# Patient Record
Sex: MALE | Race: WHITE | HISPANIC OR LATINO
[De-identification: names, ages, dates, MRNs, and addresses within clinical notes are randomized per-mention and may not be internally consistent; named-entity substitution may affect disease eponyms.]

---

## 2017-01-04 ENCOUNTER — APPOINTMENT (OUTPATIENT)
Dept: ORTHOPEDIC SURGERY | Facility: CLINIC | Age: 67
End: 2017-01-04

## 2017-01-04 VITALS — WEIGHT: 150 LBS | BODY MASS INDEX: 23.54 KG/M2 | HEIGHT: 67 IN

## 2017-01-04 DIAGNOSIS — M75.122 COMPLETE ROTATOR CUFF TEAR OR RUPTURE OF LEFT SHOULDER, NOT SPECIFIED AS TRAUMATIC: ICD-10-CM

## 2017-04-20 ENCOUNTER — APPOINTMENT (OUTPATIENT)
Dept: ORTHOPEDIC SURGERY | Facility: CLINIC | Age: 67
End: 2017-04-20

## 2017-04-20 DIAGNOSIS — M67.912 UNSPECIFIED DISORDER OF SYNOVIUM AND TENDON, LEFT SHOULDER: ICD-10-CM

## 2017-05-25 ENCOUNTER — APPOINTMENT (OUTPATIENT)
Dept: ORTHOPEDIC SURGERY | Facility: CLINIC | Age: 67
End: 2017-05-25

## 2017-05-25 DIAGNOSIS — M75.121 COMPLETE ROTATOR CUFF TEAR OR RUPTURE OF RIGHT SHOULDER, NOT SPECIFIED AS TRAUMATIC: ICD-10-CM

## 2017-05-25 DIAGNOSIS — M67.911 UNSPECIFIED DISORDER OF SYNOVIUM AND TENDON, RIGHT SHOULDER: ICD-10-CM

## 2017-05-25 DIAGNOSIS — M25.511 PAIN IN RIGHT SHOULDER: ICD-10-CM

## 2017-05-25 RX ORDER — TIMOLOL MALEATE 5 MG/ML
0.5 SOLUTION OPHTHALMIC
Qty: 5 | Refills: 0 | Status: ACTIVE | COMMUNITY
Start: 2016-07-19

## 2017-08-24 ENCOUNTER — APPOINTMENT (OUTPATIENT)
Dept: ORTHOPEDIC SURGERY | Facility: CLINIC | Age: 67
End: 2017-08-24

## 2017-12-15 ENCOUNTER — EMERGENCY (EMERGENCY)
Facility: HOSPITAL | Age: 67
LOS: 1 days | Discharge: HOME | End: 2017-12-15
Attending: EMERGENCY MEDICINE | Admitting: EMERGENCY MEDICINE
Payer: SELF-PAY

## 2017-12-15 VITALS
OXYGEN SATURATION: 96 % | HEART RATE: 62 BPM | DIASTOLIC BLOOD PRESSURE: 74 MMHG | SYSTOLIC BLOOD PRESSURE: 111 MMHG | RESPIRATION RATE: 20 BRPM | TEMPERATURE: 98 F

## 2017-12-15 VITALS
HEIGHT: 68 IN | WEIGHT: 145.06 LBS | DIASTOLIC BLOOD PRESSURE: 62 MMHG | TEMPERATURE: 98 F | OXYGEN SATURATION: 98 % | SYSTOLIC BLOOD PRESSURE: 111 MMHG | HEART RATE: 75 BPM | RESPIRATION RATE: 18 BRPM

## 2017-12-15 DIAGNOSIS — S82.899A OTHER FRACTURE OF UNSPECIFIED LOWER LEG, INITIAL ENCOUNTER FOR CLOSED FRACTURE: Chronic | ICD-10-CM

## 2017-12-15 DIAGNOSIS — Z98.89 OTHER SPECIFIED POSTPROCEDURAL STATES: Chronic | ICD-10-CM

## 2017-12-15 LAB
ALBUMIN SERPL ELPH-MCNC: 3.4 G/DL — SIGNIFICANT CHANGE UP (ref 3.3–5)
ALP SERPL-CCNC: 49 U/L — SIGNIFICANT CHANGE UP (ref 40–120)
ALT FLD-CCNC: 21 U/L RC — SIGNIFICANT CHANGE UP (ref 10–45)
APPEARANCE UR: ABNORMAL
AST SERPL-CCNC: 26 U/L — SIGNIFICANT CHANGE UP (ref 10–40)
BASOPHILS # BLD AUTO: 0 K/UL — SIGNIFICANT CHANGE UP (ref 0–0.2)
BASOPHILS NFR BLD AUTO: 0.8 % — SIGNIFICANT CHANGE UP (ref 0–2)
BILIRUB SERPL-MCNC: 0.3 MG/DL — SIGNIFICANT CHANGE UP (ref 0.2–1.2)
BILIRUB UR-MCNC: NEGATIVE — SIGNIFICANT CHANGE UP
BUN SERPL-MCNC: 14 MG/DL — SIGNIFICANT CHANGE UP (ref 7–23)
CALCIUM SERPL-MCNC: 8.9 MG/DL — SIGNIFICANT CHANGE UP (ref 8.4–10.5)
CHLORIDE SERPL-SCNC: 104 MMOL/L — SIGNIFICANT CHANGE UP (ref 96–108)
CO2 SERPL-SCNC: 25 MMOL/L — SIGNIFICANT CHANGE UP (ref 22–31)
COLOR SPEC: ABNORMAL
CREAT SERPL-MCNC: 1.06 MG/DL — SIGNIFICANT CHANGE UP (ref 0.5–1.3)
DIFF PNL FLD: ABNORMAL
EOSINOPHIL # BLD AUTO: 0.1 K/UL — SIGNIFICANT CHANGE UP (ref 0–0.5)
EOSINOPHIL NFR BLD AUTO: 2.8 % — SIGNIFICANT CHANGE UP (ref 0–6)
GAS PNL BLDV: SIGNIFICANT CHANGE UP
GLUCOSE SERPL-MCNC: 96 MG/DL — SIGNIFICANT CHANGE UP (ref 70–99)
GLUCOSE UR QL: NEGATIVE — SIGNIFICANT CHANGE UP
HCT VFR BLD CALC: 43.5 % — SIGNIFICANT CHANGE UP (ref 39–50)
HGB BLD-MCNC: 14.7 G/DL — SIGNIFICANT CHANGE UP (ref 13–17)
KETONES UR-MCNC: NEGATIVE — SIGNIFICANT CHANGE UP
LEUKOCYTE ESTERASE UR-ACNC: ABNORMAL
LIDOCAIN IGE QN: 17 U/L — SIGNIFICANT CHANGE UP (ref 7–60)
LYMPHOCYTES # BLD AUTO: 1.6 K/UL — SIGNIFICANT CHANGE UP (ref 1–3.3)
LYMPHOCYTES # BLD AUTO: 41.7 % — SIGNIFICANT CHANGE UP (ref 13–44)
MCHC RBC-ENTMCNC: 31.1 PG — SIGNIFICANT CHANGE UP (ref 27–34)
MCHC RBC-ENTMCNC: 33.8 GM/DL — SIGNIFICANT CHANGE UP (ref 32–36)
MCV RBC AUTO: 92 FL — SIGNIFICANT CHANGE UP (ref 80–100)
MONOCYTES # BLD AUTO: 0.5 K/UL — SIGNIFICANT CHANGE UP (ref 0–0.9)
MONOCYTES NFR BLD AUTO: 12.1 % — SIGNIFICANT CHANGE UP (ref 2–14)
NEUTROPHILS # BLD AUTO: 1.6 K/UL — LOW (ref 1.8–7.4)
NEUTROPHILS NFR BLD AUTO: 42.7 % — LOW (ref 43–77)
NITRITE UR-MCNC: NEGATIVE — SIGNIFICANT CHANGE UP
PH UR: 7 — SIGNIFICANT CHANGE UP (ref 5–8)
PLATELET # BLD AUTO: 201 K/UL — SIGNIFICANT CHANGE UP (ref 150–400)
POTASSIUM SERPL-MCNC: 3.9 MMOL/L — SIGNIFICANT CHANGE UP (ref 3.5–5.3)
POTASSIUM SERPL-SCNC: 3.9 MMOL/L — SIGNIFICANT CHANGE UP (ref 3.5–5.3)
PROT SERPL-MCNC: 6.6 G/DL — SIGNIFICANT CHANGE UP (ref 6–8.3)
PROT UR-MCNC: 150 MG/DL
RBC # BLD: 4.73 M/UL — SIGNIFICANT CHANGE UP (ref 4.2–5.8)
RBC # FLD: 11.9 % — SIGNIFICANT CHANGE UP (ref 10.3–14.5)
RBC CASTS # UR COMP ASSIST: >50 /HPF (ref 0–2)
SODIUM SERPL-SCNC: 139 MMOL/L — SIGNIFICANT CHANGE UP (ref 135–145)
SP GR SPEC: 1.02 — SIGNIFICANT CHANGE UP (ref 1.01–1.02)
UROBILINOGEN FLD QL: NEGATIVE — SIGNIFICANT CHANGE UP
WBC # BLD: 3.9 K/UL — SIGNIFICANT CHANGE UP (ref 3.8–10.5)
WBC # FLD AUTO: 3.9 K/UL — SIGNIFICANT CHANGE UP (ref 3.8–10.5)
WBC UR QL: SIGNIFICANT CHANGE UP /HPF (ref 0–5)

## 2017-12-15 PROCEDURE — 82330 ASSAY OF CALCIUM: CPT

## 2017-12-15 PROCEDURE — 80053 COMPREHEN METABOLIC PANEL: CPT

## 2017-12-15 PROCEDURE — 81001 URINALYSIS AUTO W/SCOPE: CPT

## 2017-12-15 PROCEDURE — 99285 EMERGENCY DEPT VISIT HI MDM: CPT

## 2017-12-15 PROCEDURE — 84132 ASSAY OF SERUM POTASSIUM: CPT

## 2017-12-15 PROCEDURE — 99284 EMERGENCY DEPT VISIT MOD MDM: CPT | Mod: 25

## 2017-12-15 PROCEDURE — 85014 HEMATOCRIT: CPT

## 2017-12-15 PROCEDURE — 83605 ASSAY OF LACTIC ACID: CPT

## 2017-12-15 PROCEDURE — 74176 CT ABD & PELVIS W/O CONTRAST: CPT | Mod: 26

## 2017-12-15 PROCEDURE — 83690 ASSAY OF LIPASE: CPT

## 2017-12-15 PROCEDURE — 74176 CT ABD & PELVIS W/O CONTRAST: CPT

## 2017-12-15 PROCEDURE — 85027 COMPLETE CBC AUTOMATED: CPT

## 2017-12-15 PROCEDURE — 82435 ASSAY OF BLOOD CHLORIDE: CPT

## 2017-12-15 PROCEDURE — 84295 ASSAY OF SERUM SODIUM: CPT

## 2017-12-15 PROCEDURE — 82803 BLOOD GASES ANY COMBINATION: CPT

## 2017-12-15 PROCEDURE — 87086 URINE CULTURE/COLONY COUNT: CPT

## 2017-12-15 PROCEDURE — 82947 ASSAY GLUCOSE BLOOD QUANT: CPT

## 2017-12-15 RX ORDER — SODIUM CHLORIDE 9 MG/ML
1000 INJECTION INTRAMUSCULAR; INTRAVENOUS; SUBCUTANEOUS ONCE
Qty: 0 | Refills: 0 | Status: COMPLETED | OUTPATIENT
Start: 2017-12-15 | End: 2017-12-15

## 2017-12-15 RX ADMIN — SODIUM CHLORIDE 2000 MILLILITER(S): 9 INJECTION INTRAMUSCULAR; INTRAVENOUS; SUBCUTANEOUS at 14:18

## 2017-12-15 NOTE — ED ADULT NURSE NOTE - CHPI ED SYMPTOMS NEG
no abdominal distension/no burning urination/no fever/no nausea/no blood in stool/no diarrhea/no vomiting/no chills

## 2017-12-15 NOTE — ED PROVIDER NOTE - OBJECTIVE STATEMENT
66 y/o male with PMH HLD p/w abd pain and hematuria. Per patient, has had intermittent L abd pain radiating around o the L flank. Also reports blood in urine, that started 1 month ago as well. Saw a urologist in the [facility], did a cystoscopy and US, said saw a small renal stone - started Patient on Urofull (ciproi, doxy, and pydium)  Pain Saw a urologist who did a UA and culture, told to call on Monday for results. Unsure of UA results. Denies f/c, CP, SOB, N/V/D,    Urologist: Raudel   PMD: Vania Smith

## 2017-12-15 NOTE — ED PROVIDER NOTE - MEDICAL DECISION MAKING DETAILS
DEREK Duenas MD: Pt is a 68 y/o male with PMH HLD who p/w abd pain and hematuria x 2 weeks. Per patient, has had intermittent L abd pain radiating around to the L flank.  Saw a urologist in the [facility] [place] 2 weeks ago, had a CT scan that showed irregularity to R side of bladder and a cystoscopic biopsy was done. He was also dx with enlarged prostate and was told that he might have a small kidney stone on imaging. Was told that he has a bladder infection, was started on Urofull (cipro, doxy, and pyridium). Pain recently Saw a urologist who did a UA and culture, told to call on Monday for results. Unsure of UA results. Denies f/c, CP, SOB, N/V/D, DEREK Duenas MD: Pt is a 66 y/o male with PMH HLD who p/w abd pain and hematuria x 2 weeks. Per patient, has had intermittent L abd pain radiating around to the L flank.  Saw a urologist in the [facility] [place] 2 weeks ago, had a CT scan that showed irregularity to R side of bladder and a cystoscopic biopsy was done. He was also dx with enlarged prostate. Was told that he has a bladder infection, was started on Urofull (cipro, doxy, and pyridium). Pain recently saw a urologist 2 days ago who did a UA and culture. Pt told to call in a few days for cx results. Denies f/c, CP, SOB, N/V/D, HA, dizziness.  DDx: UTI, cystitis, kidney stone, other cause of hematuria  Plan: basic labs, CT scan to r/o stone

## 2017-12-15 NOTE — ED PROVIDER NOTE - PROGRESS NOTE DETAILS
Pain resolved in ED, well appearing, nontoxic, no clinical evidence of pyelonephritis, CT demonstrates recently passed stone v (less likely) ascending infection.  Already on Abx, no abd pain, will be able to f/u c PCP.  Does not require pain Rx.  D/C.  --BMM

## 2017-12-15 NOTE — ED ADULT NURSE NOTE - OBJECTIVE STATEMENT
66 YO male with PMH HLD, left rotator cuff tear, & right inguinal hernia repair via walk in presenting with 7/10 left abdominal pain and hematuria. Pain is described as sharp and is constant, radiates to the back . Pt reports that abdominal pain and hematuria started on 11/4/2017 and [place] pt had [place] cystoscopy and byopsy, pt reports urology found stones at that time. Pt recently saw a urologist when he returned from the [place] and is unsure of the results of the UA and culture.    Pt denies urinary symptoms. Last BM:     2017.   Pt Axox4, gross neuro intact, PERRL mm. Lungs clear throughout bilateral. S1S2 heard. Abdomen soft, non-tender, non-distended. Skin warm, dry, and intact. Safety and comfort measures maintained. family present at bedside. 66 YO male with PMH HLD, left rotator cuff tear, & right inguinal hernia repair via walk in presenting with 7/10 left abdominal pain and hematuria. Pain is described as sharp and is constant, radiates to the back . Pt reports that abdominal pain and hematuria started on 11/4/2017 and was [place] while [place], pt had a cystoscopy and byopsy, pt reports urology found stones at that time. Pt recently saw a urologist when he returned from the [place] and is unsure of the results of the UA and culture. Pt denies chest pain, shortness of breath, numbness/tingling, constipation, diarrhea, nausea, vomiting, or other symptoms.  Pt Axox4, gross neuro intact, PERRL mm. Lungs clear throughout bilateral. S1S2 heard. Abdomen soft, non-tender, non-distended. Skin warm, dry, and intact. Safety and comfort measures maintained. family present at bedside.

## 2017-12-16 LAB
CULTURE RESULTS: NO GROWTH — SIGNIFICANT CHANGE UP
SPECIMEN SOURCE: SIGNIFICANT CHANGE UP

## 2019-02-01 NOTE — ED ADULT NURSE NOTE - DISCHARGE TEACHING
with patient and family at bedside Closure 4 Information: This tab is for additional flaps and grafts above and beyond our usual structured repairs.  Please note if you enter information here it will not currently bill and you will need to add the billing information manually.

## 2020-06-16 DIAGNOSIS — Z20.828 CONTACT WITH AND (SUSPECTED) EXPOSURE TO OTHER VIRAL COMMUNICABLE DISEASES: ICD-10-CM

## 2020-07-09 ENCOUNTER — RESULT CHARGE (OUTPATIENT)
Age: 70
End: 2020-07-09

## 2020-07-13 DIAGNOSIS — Z01.818 ENCOUNTER FOR OTHER PREPROCEDURAL EXAMINATION: ICD-10-CM

## 2020-07-14 ENCOUNTER — LABORATORY RESULT (OUTPATIENT)
Age: 70
End: 2020-07-14

## 2020-07-14 ENCOUNTER — APPOINTMENT (OUTPATIENT)
Dept: DISASTER EMERGENCY | Facility: CLINIC | Age: 70
End: 2020-07-14

## 2020-07-17 ENCOUNTER — APPOINTMENT (OUTPATIENT)
Dept: PULMONOLOGY | Facility: CLINIC | Age: 70
End: 2020-07-17
Payer: MEDICARE

## 2020-07-17 VITALS
DIASTOLIC BLOOD PRESSURE: 69 MMHG | RESPIRATION RATE: 12 BRPM | OXYGEN SATURATION: 97 % | HEIGHT: 67 IN | BODY MASS INDEX: 23.54 KG/M2 | WEIGHT: 150 LBS | SYSTOLIC BLOOD PRESSURE: 105 MMHG | HEART RATE: 63 BPM

## 2020-07-17 DIAGNOSIS — U07.1 COVID-19: ICD-10-CM

## 2020-07-17 DIAGNOSIS — J12.82 COVID-19: ICD-10-CM

## 2020-07-17 LAB — POCT - HEMOGLOBIN (HGB), QUANTITATIVE, TRANSCUTANEOUS: 14.5

## 2020-07-17 PROCEDURE — 94060 EVALUATION OF WHEEZING: CPT

## 2020-07-17 PROCEDURE — 94729 DIFFUSING CAPACITY: CPT

## 2020-07-17 PROCEDURE — 94727 GAS DIL/WSHOT DETER LNG VOL: CPT

## 2020-07-17 PROCEDURE — 99205 OFFICE O/P NEW HI 60 MIN: CPT | Mod: 25

## 2020-07-17 PROCEDURE — 88738 HGB QUANT TRANSCUTANEOUS: CPT

## 2020-07-17 PROCEDURE — 71046 X-RAY EXAM CHEST 2 VIEWS: CPT

## 2020-07-17 NOTE — PHYSICAL EXAM
[No Acute Distress] : no acute distress [Normal Oropharynx] : normal oropharynx [No Neck Mass] : no neck mass [Supple] : supple [No JVD] : no jvd [No Murmurs] : no murmurs [Normal S1, S2] : normal s1, s2 [No Abnormalities] : no abnormalities [Clear to Auscultation Bilaterally] : clear to auscultation bilaterally [Normal to Percussion] : normal to percussion [Benign] : benign [No Clubbing] : no clubbing [No Cyanosis] : no cyanosis [No Edema] : no edema [Oriented x3] : oriented x3 [No Focal Deficits] : no focal deficits

## 2020-07-17 NOTE — ASSESSMENT
[FreeTextEntry1] : I see no need for further diagnostic procedures or treatment in this patient.\par I have returned to your care.\par \par Thank you for me to partake in his care.\par Please keep me informed if I can be of any assistance in the future.

## 2020-07-17 NOTE — CONSULT LETTER
[Consult Letter:] : I had the pleasure of evaluating your patient, [unfilled]. [Please see my note below.] : Please see my note below. [Dear  ___] : Dear ~TERRA, [Sincerely,] : Sincerely, [Consult Closing:] : Thank you very much for allowing me to participate in the care of this patient.  If you have any questions, please do not hesitate to contact me. [FreeTextEntry2] : Antonio Lawrence D.O.\par  [FreeTextEntry3] : Tim Holt MD FCCP\par

## 2020-07-17 NOTE — DISCUSSION/SUMMARY
[FreeTextEntry1] : Patient is status post COVID infection with pneumonia by history.\par Presently is clinically radiographically and functionally well.

## 2020-07-17 NOTE — HISTORY OF PRESENT ILLNESS
[Never] : never [TextBox_4] : STEPHEN CASTILLO is a 69 year old  M referred for pulmonary evaluation for COVID.\par \par 3/20 had COVID. Was ill but treated home. Believes had CXR told something there.\par Presently feels pretty good. Is improving.\par Mild BROWN. Mild change in exercise tolerance. Minimal cough unchanged from prior to COVID.\par No wheezing. No CP\par Overall feeling pretty well.\par \par Past pulmonary history. N\par Occupational Exposure. N\par Family history of pulmonary disease. N\par Recent travel N\par Pets N\par \par Prior X ray  NRAD\par

## 2021-09-10 ENCOUNTER — APPOINTMENT (OUTPATIENT)
Dept: ORTHOPEDIC SURGERY | Facility: CLINIC | Age: 71
End: 2021-09-10
Payer: MEDICARE

## 2021-09-10 VITALS
HEART RATE: 66 BPM | HEIGHT: 67 IN | WEIGHT: 149 LBS | SYSTOLIC BLOOD PRESSURE: 123 MMHG | BODY MASS INDEX: 23.39 KG/M2 | DIASTOLIC BLOOD PRESSURE: 85 MMHG

## 2021-09-10 DIAGNOSIS — M17.0 BILATERAL PRIMARY OSTEOARTHRITIS OF KNEE: ICD-10-CM

## 2021-09-10 PROCEDURE — 73562 X-RAY EXAM OF KNEE 3: CPT | Mod: RT

## 2021-09-10 PROCEDURE — 99213 OFFICE O/P EST LOW 20 MIN: CPT

## 2021-10-06 PROBLEM — U07.1 PNEUMONIA DUE TO COVID-19 VIRUS: Status: ACTIVE | Noted: 2020-07-17

## 2024-05-20 ENCOUNTER — APPOINTMENT (OUTPATIENT)
Dept: SURGERY | Facility: CLINIC | Age: 74
End: 2024-05-20
Payer: MEDICARE

## 2024-05-20 VITALS
TEMPERATURE: 98.2 F | SYSTOLIC BLOOD PRESSURE: 116 MMHG | HEIGHT: 67 IN | HEART RATE: 68 BPM | BODY MASS INDEX: 19.62 KG/M2 | DIASTOLIC BLOOD PRESSURE: 70 MMHG | WEIGHT: 125 LBS

## 2024-05-20 PROCEDURE — 99203 OFFICE O/P NEW LOW 30 MIN: CPT

## 2024-06-04 ENCOUNTER — OUTPATIENT (OUTPATIENT)
Dept: OUTPATIENT SERVICES | Facility: HOSPITAL | Age: 74
LOS: 1 days | End: 2024-06-04

## 2024-06-04 VITALS
HEART RATE: 67 BPM | SYSTOLIC BLOOD PRESSURE: 103 MMHG | RESPIRATION RATE: 16 BRPM | OXYGEN SATURATION: 97 % | WEIGHT: 128.09 LBS | HEIGHT: 67 IN | TEMPERATURE: 97 F | DIASTOLIC BLOOD PRESSURE: 67 MMHG

## 2024-06-04 DIAGNOSIS — Z98.89 OTHER SPECIFIED POSTPROCEDURAL STATES: Chronic | ICD-10-CM

## 2024-06-04 DIAGNOSIS — K40.90 UNILATERAL INGUINAL HERNIA, WITHOUT OBSTRUCTION OR GANGRENE, NOT SPECIFIED AS RECURRENT: ICD-10-CM

## 2024-06-04 DIAGNOSIS — S82.899A OTHER FRACTURE OF UNSPECIFIED LOWER LEG, INITIAL ENCOUNTER FOR CLOSED FRACTURE: Chronic | ICD-10-CM

## 2024-06-04 DIAGNOSIS — Z98.890 OTHER SPECIFIED POSTPROCEDURAL STATES: Chronic | ICD-10-CM

## 2024-06-04 RX ORDER — CIPROFLOXACIN LACTATE 400MG/40ML
0 VIAL (ML) INTRAVENOUS
Qty: 0 | Refills: 0 | DISCHARGE

## 2024-06-04 NOTE — H&P PST ADULT - PROBLEM SELECTOR PLAN 1
Scheduled for laparoscopic left inguinal repair  Preop instructions provided and patient verbalizes understanding.  Labs done and results pending.  Famotidine provided with instructions.  BMP, CBC, done by pcp will request

## 2024-06-04 NOTE — H&P PST ADULT - NSICDXPASTSURGICALHX_GEN_ALL_CORE_FT
PAST SURGICAL HISTORY:  Ankle fracture (R) ORIF 1994    H/O inguinal hernia repair (R) 2016    H/O repair of left rotator cuff

## 2024-06-04 NOTE — H&P PST ADULT - NSANTHOSAYNRD_GEN_A_CORE
No. COLIN screening performed.  STOP BANG Legend: 0-2 = LOW Risk; 3-4 = INTERMEDIATE Risk; 5-8 = HIGH Risk

## 2024-06-04 NOTE — H&P PST ADULT - HISTORY OF PRESENT ILLNESS
A 73-year-old male status post laparoscopic right inguinal hernia repair approximately 8 years ago.  The patient now has pain in the left groin.  He has no other abdominal surgeries. A 73-year-old male status post laparoscopic right inguinal hernia repair approximately 8 years ago.  Patient is c/o pain in the left groin for 7-8 months. Pt went to surgeon and was evaluated.  He Now present in Presurgical testing for preop evaluation for scheduled procedure laparoscopic left inguinal hernia repair.

## 2024-06-07 VITALS
HEART RATE: 53 BPM | SYSTOLIC BLOOD PRESSURE: 116 MMHG | DIASTOLIC BLOOD PRESSURE: 74 MMHG | TEMPERATURE: 98 F | WEIGHT: 128.09 LBS | RESPIRATION RATE: 16 BRPM | OXYGEN SATURATION: 97 % | HEIGHT: 67 IN

## 2024-06-07 NOTE — ASU PREOPERATIVE ASSESSMENT, ADULT (IPARK ONLY) - FALL HARM RISK - UNIVERSAL INTERVENTIONS
Bed in lowest position, wheels locked, appropriate side rails in place/Call bell, personal items and telephone in reach/Instruct patient to call for assistance before getting out of bed or chair/Non-slip footwear when patient is out of bed/[place] to call system/Physically safe environment - no spills, clutter or unnecessary equipment/Purposeful Proactive Rounding/Room/bathroom lighting operational, light cord in reach

## 2024-06-07 NOTE — ASU PREOPERATIVE ASSESSMENT, ADULT (IPARK ONLY) - FALL HARM RISK - FALLEN IN PAST
Ben Gonzales is a 59 year old male who has a history of CAD, HTN, gout, fu    Referred By: Dr. Daniel Benítez  [phone] fax      History of Present Illness    59 year old yo male with h/o HTN, gout, CAD by CT, here for f/u    Last Seen 7/21/21    Since Last Visit,    Denies any exertional chest pain, no shortness of breath, no palpitations, no presyncope or syncope.    6/21-> EF 60%, LVD, DD1, mild Aortic Stenosis    SBP 130s at home    LDL 92 ( 7/21)    + 6 lbs since summer 2021      Review of Systems  Systemic: + 6 lbs since summer 2021  HEENT: No visual disturbance  Cardiovascular:  No exertional chest pain, no palpitations, no LE edema  Pulmonary: No exertional shortness of breath, no orthopnea, no PND  Gastrointestinal: No GI bleeding.  Genitourinary: No hematuria  Musculoskeletal:  no myalgias.   Hematologic: No excessive bruising or bleeding  Neurological: No dizziness or weakness  Psychiatric: No mood disorders  Integumentary: no rashes, no skin lesions    Past Medical History:   Diagnosis Date   • Atherosclerosis of coronary artery of native heart without angina pectoris 1/14/2019   • Benign essential hypertension 1/11/2019   • CAD (coronary artery disease) 1/11/2019   • Chest pain 1/11/2019   • Essential hypertension 1/14/2019   • Familial combined hyperlipidemia 1/11/2019     Social History     Socioeconomic History   • Marital status: /Civil Union     Spouse name: Not on file   • Number of children: Not on file   • Years of education: Not on file   • Highest education level: Not on file   Occupational History   • Not on file   Tobacco Use   • Smoking status: Former Smoker     Types: Cigars     Quit date: 12/29/2018     Years since quitting: 3.0   • Smokeless tobacco: Former User   Substance and Sexual Activity   • Alcohol use: Yes   • Drug use: Never   • Sexual activity: Not on file   Other Topics Concern   • Not on file   Social History Narrative   • Not on file     Social Determinants  of Health     Financial Resource Strain: Not on file   Food Insecurity: Not on file   Transportation Needs: Not on file   Physical Activity: Not on file   Stress: Not on file   Social Connections: Not on file   Intimate Partner Violence: Not on file     Current Medications    AMLODIPINE-OLMESARTAN 10-40 MG TAB    TAKE 1 TABLET BY MOUTH  DAILY     ASCORBIC ACID (VITAMIN C) 1000 MG TABLET    Take 1,000 mg by mouth daily.    ASPIRIN (ECOTRIN) 81 MG EC TABLET    Take 81 mg by mouth daily.    ATORVASTATIN (LIPITOR) 20 MG TABLET    TAKE 1 TABLET BY MOUTH  DAILY AS DIRECTED    IBUPROFEN (ADVIL) 200 MG CAPSULE    Take 200 mg by mouth every 6 hours as needed for Pain.    MULTIPLE VITAMIN (VITAMIN - THERAPEUTIC MULTIVITAMIN) CAPSULE    Take 1 capsule by mouth daily.    OMEGA-3 FATTY ACIDS (OMEGA-3 FISH OIL) 1000 MG CAPSULE    Take 2 capsules by mouth daily.    TURMERIC CURCUMIN 500 MG CAP    Take 500 mg by mouth daily.     VITAMIN B COMPLEX-C CAP    Take 1 capsule by mouth daily.      ALLERGIES:  No Known Allergies  There were no vitals taken for this visit.    Physical Exam  Vitals reviewed    Visit Vitals  /72 (BP Location: LUE - Left upper extremity, Patient Position: Standing, Cuff Size: Large Adult)   Pulse 86   Temp 98 °F (36.7 °C) (Temporal)   Wt 97.5 kg (215 lb)   BMI 34.70 kg/m²       Constitutional: NAD  Head: Normocephalic and atraumatic.   Eyes: Conjunctivae are normal. Anicteric  Neck: No JVD present. No bruits  Cardiovascular: Normal rate, regular rhythm.  Soft NARCISO RUSB  Pulmonary: Clear to auscultation with no wheezes.  No rales.   Abdominal: Soft. Bowel sounds are normal. No Organomegally  Extremities: No edema.   Neurological:  alert and oriented to person, place, and time  Skin: No rash noted. No erythema. No pallor.   Psychiatric: normal mood and affect.     --------    2021 TTE  1. Left ventricle: The cavity size is normal. Wall thickness is increased.     There is concentric hypertrophy. Systolic  function is vigorous. The     ejection fraction was measured by biplane method of disks. Doppler     parameters are consistent with abnormal left ventricular relaxation     (grade 1 diastolic dysfunction). The ejection fraction is 70%.  2. Aortic valve: Not well visualized. Unable to determine morphology. The     leaflets are mildly calcified. There is mild stenosis.  3. Mitral valve: The leaflets are mildly calcified. Trivial regurgitation.  4. Left atrium: The atrium is mildly dilated.  5. Right ventricle: Systolic function is normal.  6. Tricuspid valve: Mild regurgitation.  Impressions:   The study is unchanged since the study of 12/29/2020.   AS  remains mild.    ---------------------------------------------------    A/P    1) Essential Hypertension    Controlled  114/72   130s SBP at home      Continue Amlodipine-Olmesartan 10-40 mg qd      BP log at home, call if > 140/90s consistenly      Watch diet and exercise      Limit alcohol          2) Hyperlipidemia    Controlled  LDL 97 recently (7 /21)  On Atov 20 mg once a day  FLP summer 2022     3) CAD on CT angiogram    Asx  No angina  Continue GDMT--> ASA, Statin      LDL 92 recently ( 2021)  If Future LDL >100, will increase Statin    4) Mild Aortic stenosis    Stable on exam  TTE in Summer 2022          RV 6 months   No

## 2024-06-07 NOTE — ASU PREOPERATIVE ASSESSMENT, ADULT (IPARK ONLY) - RESPIRATORY RATE (BREATHS/MIN)
From: Shasha Weaver  To: Jerod Bai  Sent: 12/23/2021 9:35 AM CST  Subject: Need a new aerochamberplus    Hi! I’m in [place] and forgot my aerochamberplus which I need to take my Dulera. Can it be called into a [facility] down here?  [phone]  Thank you!   16

## 2024-06-09 ENCOUNTER — TRANSCRIPTION ENCOUNTER (OUTPATIENT)
Age: 74
End: 2024-06-09

## 2024-06-10 ENCOUNTER — APPOINTMENT (OUTPATIENT)
Dept: SURGERY | Facility: AMBULATORY SURGERY CENTER | Age: 74
End: 2024-06-10

## 2024-06-10 ENCOUNTER — TRANSCRIPTION ENCOUNTER (OUTPATIENT)
Age: 74
End: 2024-06-10

## 2024-06-10 ENCOUNTER — OUTPATIENT (OUTPATIENT)
Dept: OUTPATIENT SERVICES | Facility: HOSPITAL | Age: 74
LOS: 1 days | Discharge: HOME | End: 2024-06-10
Payer: MEDICARE

## 2024-06-10 VITALS
HEART RATE: 63 BPM | RESPIRATION RATE: 18 BRPM | DIASTOLIC BLOOD PRESSURE: 62 MMHG | OXYGEN SATURATION: 100 % | SYSTOLIC BLOOD PRESSURE: 116 MMHG

## 2024-06-10 DIAGNOSIS — Z98.89 OTHER SPECIFIED POSTPROCEDURAL STATES: Chronic | ICD-10-CM

## 2024-06-10 DIAGNOSIS — Z98.890 OTHER SPECIFIED POSTPROCEDURAL STATES: Chronic | ICD-10-CM

## 2024-06-10 DIAGNOSIS — K40.90 UNILATERAL INGUINAL HERNIA, WITHOUT OBSTRUCTION OR GANGRENE, NOT SPECIFIED AS RECURRENT: ICD-10-CM

## 2024-06-10 DIAGNOSIS — S82.899A OTHER FRACTURE OF UNSPECIFIED LOWER LEG, INITIAL ENCOUNTER FOR CLOSED FRACTURE: Chronic | ICD-10-CM

## 2024-06-10 PROCEDURE — 49650 LAP ING HERNIA REPAIR INIT: CPT | Mod: GC

## 2024-06-10 DEVICE — MESH HERNIA INGUINAL PROGRIP LAPAROSCOPIC 15 X 10CM LEFT: Type: IMPLANTABLE DEVICE | Site: LEFT | Status: FUNCTIONAL

## 2024-06-10 DEVICE — TROCAR COVIDIEN SPACEMAKER PRO BLUNT TIP 10MM-12MM WITH DISSECTION BALLOON OVAL: Type: IMPLANTABLE DEVICE | Site: LEFT | Status: FUNCTIONAL

## 2024-06-10 RX ORDER — ROSUVASTATIN CALCIUM 5 MG/1
1 TABLET ORAL
Refills: 0 | DISCHARGE

## 2024-06-10 NOTE — BRIEF OPERATIVE NOTE - NSICDXBRIEFPROCEDURE_GEN_ALL_CORE_FT
PROCEDURES:  Laparoscopic repair of inguinal hernia with repair of umbilical hernia 10-Gus-2024 13:09:01  Navi Solano

## 2024-06-10 NOTE — BRIEF OPERATIVE NOTE - NSICDXBRIEFPOSTOP_GEN_ALL_CORE_FT
POST-OP DIAGNOSIS:  Left inguinal hernia 10-Gus-2024 13:08:42  Navi Solano  Umbilical hernia 10-Gus-2024 13:09:21  Navi Solano

## 2024-06-17 ENCOUNTER — APPOINTMENT (OUTPATIENT)
Dept: SURGERY | Facility: CLINIC | Age: 74
End: 2024-06-17
Payer: MEDICARE

## 2024-06-17 VITALS
HEIGHT: 67 IN | BODY MASS INDEX: 20.4 KG/M2 | TEMPERATURE: 97.9 F | DIASTOLIC BLOOD PRESSURE: 73 MMHG | HEART RATE: 59 BPM | SYSTOLIC BLOOD PRESSURE: 109 MMHG | WEIGHT: 130 LBS

## 2024-06-17 PROCEDURE — 99024 POSTOP FOLLOW-UP VISIT: CPT

## 2024-06-20 ENCOUNTER — APPOINTMENT (OUTPATIENT)
Dept: ORTHOPEDIC SURGERY | Facility: CLINIC | Age: 74
End: 2024-06-20
Payer: MEDICARE

## 2024-06-20 VITALS — WEIGHT: 129 LBS | BODY MASS INDEX: 20.25 KG/M2 | HEIGHT: 67 IN

## 2024-06-20 DIAGNOSIS — M54.16 RADICULOPATHY, LUMBAR REGION: ICD-10-CM

## 2024-06-20 PROCEDURE — 72100 X-RAY EXAM L-S SPINE 2/3 VWS: CPT

## 2024-06-20 PROCEDURE — 99213 OFFICE O/P EST LOW 20 MIN: CPT | Mod: 25

## 2024-06-20 NOTE — HISTORY OF PRESENT ILLNESS
[de-identified] : Mr. STEPHEN CASTILLO  is a 73 year old male who presents with 10 days of right lumbar radiculopathy.  His pain is not improving.  He will take   Denies any LE radicular symptoms.  Normal bowel and bladder control.   Denies any recent fevers, chills, sweats, weight loss, or infection. [5] : a maximum pain level of 5/10

## 2024-06-20 NOTE — PHYSICAL EXAM
[de-identified] : Walking without assistive aids.  No lumbar tenderness or spasm.  Mild lumbar discomfort with forward bending.  He can bend at the waist and bring fingertips to within 1 foot of the floor.  Grossly equal leg lengths.  Pain-free passive and active range of motion both hips.  Strength grossly intact to all major muscle groups of both lower extremities.  Heel toe walk and he can perform double stance heel raises.  Sensation intact to light touch both lower extremities. [de-identified] : X-rays lumbar spine AP and lateral views demonstrate mild spondylosis changes.  No appreciable fractures.

## 2024-06-20 NOTE — DISCUSSION/SUMMARY
[de-identified] : Some lumbar radicular type pain right lower extremity.  Recommend conservative management with home stretching exercises and some outpatient physical therapy.  Over-the-counter Aleve or Motrin can be taken if needed.  Follow-up in 6 weeks or sooner if needed.

## 2024-11-15 ENCOUNTER — OFFICE (OUTPATIENT)
Dept: URBAN - METROPOLITAN AREA CLINIC 109 | Facility: CLINIC | Age: 74
Setting detail: OPHTHALMOLOGY
End: 2024-11-15
Payer: COMMERCIAL

## 2024-11-15 DIAGNOSIS — H43.393: ICD-10-CM

## 2024-11-15 DIAGNOSIS — H43.813: ICD-10-CM

## 2024-11-15 DIAGNOSIS — H52.4: ICD-10-CM

## 2024-11-15 DIAGNOSIS — H01.001: ICD-10-CM

## 2024-11-15 DIAGNOSIS — H01.004: ICD-10-CM

## 2024-11-15 DIAGNOSIS — H25.13: ICD-10-CM

## 2024-11-15 PROCEDURE — 92004 COMPRE OPH EXAM NEW PT 1/>: CPT | Performed by: OPHTHALMOLOGY

## 2024-11-15 PROCEDURE — 92015 DETERMINE REFRACTIVE STATE: CPT | Performed by: OPHTHALMOLOGY

## 2024-11-15 ASSESSMENT — REFRACTION_MANIFEST
OD_ADD: +2.50
OD_CYLINDER: -2.25
OD_AXIS: 105
OD_CYLINDER: -2.00
OD_VA1: 20/20
OS_SPHERE: +1.50
OS_SPHERE: +2.00
OD_SPHERE: +2.00
OS_VA1: 20/20-2
OS_AXIS: 074
OS_AXIS: 075
OS_VA1: 20/20-
OD_AXIS: 105
OS_ADD: +2.50
OS_CYLINDER: -1.50
OS_CYLINDER: -1.75
OD_VA1: 20/30-
OD_SPHERE: +1.75

## 2024-11-15 ASSESSMENT — KERATOMETRY
OD_AXISANGLE_DEGREES: 021
OS_AXISANGLE_DEGREES: 167
OD_K1POWER_DIOPTERS: 40.50
OS_K2POWER_DIOPTERS: 42.00
OD_K2POWER_DIOPTERS: 42.00
OS_K1POWER_DIOPTERS: 40.25

## 2024-11-15 ASSESSMENT — CONFRONTATIONAL VISUAL FIELD TEST (CVF)
OD_FINDINGS: FULL
OS_FINDINGS: FULL

## 2024-11-15 ASSESSMENT — LID EXAM ASSESSMENTS
OD_BLEPHARITIS: RUL 1+
OS_BLEPHARITIS: LUL 1+

## 2024-11-15 ASSESSMENT — REFRACTION_AUTOREFRACTION
OS_AXIS: 74
OD_CYLINDER: -2.25
OS_CYLINDER: -2.00
OS_SPHERE: +2.25
OD_SPHERE: +2.00
OD_AXIS: 108

## 2024-11-15 ASSESSMENT — TONOMETRY
OS_IOP_MMHG: 13
OD_IOP_MMHG: 13

## 2024-11-15 ASSESSMENT — VISUAL ACUITY
OD_BCVA: 20/50-2
OS_BCVA: 20/40-1

## (undated) DEVICE — SUT VICRYL 0 27" UR-6

## (undated) DEVICE — BLADE SURGICAL #15 CARBON

## (undated) DEVICE — TUBING INSUFLATOR FOR VIDEO TOWER

## (undated) DEVICE — TUBING STRYKEFLOW II SUCTION / IRRIGATOR

## (undated) DEVICE — PREP CHLORAPREP HI-LITE ORANGE 26ML

## (undated) DEVICE — SUT VICRYL 3-0 27" SH UNDYED

## (undated) DEVICE — TUBING HYDRO-SURG PLUS IRRIGATOR W SMOKEVAC & PROBE

## (undated) DEVICE — TROCAR COVIDIEN VERSAPORT BLADELESS OPTICAL 5MM STANDARD

## (undated) DEVICE — PACK GENERAL LAPAROSCOPY

## (undated) DEVICE — SUT MONOCRYL 4-0 27" PS-2 UNDYED

## (undated) DEVICE — VENODYNE/SCD SLEEVE CALF MEDIUM

## (undated) DEVICE — DRSG STERISTRIPS 0.25 X 3"

## (undated) DEVICE — SOL IRR POUR NS 0.9% 500ML

## (undated) DEVICE — SHEARS COVIDIEN ENDO SHEAR 5MM X 31CM W UNIPOLAR CAUTERY

## (undated) DEVICE — CATH ANGIO 14G X 3.25"

## (undated) DEVICE — ELCTR GROUNDING PAD ADULT COVIDIEN

## (undated) DEVICE — TROCAR ETHICON ENDOPATH XCEL BLADELESS 5MM X 100MM STABILITY

## (undated) DEVICE — GLV 7.5 PROTEXIS (WHITE)

## (undated) DEVICE — SOL INJ NS 0.9% 1000ML

## (undated) DEVICE — WARMING BLANKET UPPER ADULT

## (undated) DEVICE — POSITIONER FOAM EGG CRATE ULNAR 2PCS (PINK)

## (undated) DEVICE — ELCTR ROCKER SWITCH PENCIL BLUE 10FT